# Patient Record
Sex: MALE | Race: BLACK OR AFRICAN AMERICAN | URBAN - METROPOLITAN AREA
[De-identification: names, ages, dates, MRNs, and addresses within clinical notes are randomized per-mention and may not be internally consistent; named-entity substitution may affect disease eponyms.]

---

## 2022-01-06 ENCOUNTER — NURSE TRIAGE (OUTPATIENT)
Dept: OTHER | Facility: OTHER | Age: 57
End: 2022-01-06

## 2022-01-07 NOTE — TELEPHONE ENCOUNTER
Regarding: Covid Symptomatic Headache  ----- Message from [facility] sent at 1/6/2022  7:49 PM EST -----  "I have a sore throat, headache and stuffy   I am fully vaccinated "